# Patient Record
Sex: MALE | Race: WHITE | NOT HISPANIC OR LATINO | URBAN - METROPOLITAN AREA
[De-identification: names, ages, dates, MRNs, and addresses within clinical notes are randomized per-mention and may not be internally consistent; named-entity substitution may affect disease eponyms.]

---

## 2024-01-31 ENCOUNTER — OFFICE VISIT (OUTPATIENT)
Dept: URBAN - METROPOLITAN AREA CLINIC 63 | Facility: CLINIC | Age: 29
End: 2024-01-31
Payer: COMMERCIAL

## 2024-01-31 VITALS
BODY MASS INDEX: 20.99 KG/M2 | HEIGHT: 72 IN | HEART RATE: 66 BPM | WEIGHT: 155 LBS | OXYGEN SATURATION: 98 % | DIASTOLIC BLOOD PRESSURE: 62 MMHG | TEMPERATURE: 98.6 F | SYSTOLIC BLOOD PRESSURE: 112 MMHG

## 2024-01-31 DIAGNOSIS — R17 TOTAL BILIRUBIN, ELEVATED: ICD-10-CM

## 2024-01-31 DIAGNOSIS — Z90.49 HISTORY OF CHOLECYSTECTOMY: ICD-10-CM

## 2024-01-31 DIAGNOSIS — R10.13 EPIGASTRIC PAIN: ICD-10-CM

## 2024-01-31 PROBLEM — 428882003: Status: ACTIVE | Noted: 2024-01-31

## 2024-01-31 PROBLEM — 66187002: Status: ACTIVE | Noted: 2024-01-31

## 2024-01-31 PROCEDURE — 99204 OFFICE O/P NEW MOD 45 MIN: CPT

## 2024-01-31 RX ORDER — IBUPROFEN 800 MG
AS DIRECTED TABLET ORAL
Status: ACTIVE | COMMUNITY

## 2024-01-31 RX ORDER — SUCRALFATE 1 G/1
1 TABLET ON AN EMPTY STOMACH TABLET ORAL TWICE A DAY
Status: ACTIVE | COMMUNITY

## 2024-01-31 RX ORDER — FAMOTIDINE 20 MG/1
1 TABLET AT BEDTIME AS NEEDED TABLET, FILM COATED ORAL ONCE A DAY
Status: ACTIVE | COMMUNITY

## 2024-01-31 RX ORDER — PANTOPRAZOLE SODIUM 40 MG/1
1 TABLET TABLET, DELAYED RELEASE ORAL ONCE A DAY
Status: ACTIVE | COMMUNITY

## 2024-01-31 RX ORDER — LOPERAMIDE HYDROCHLORIDE 2 MG/1
1 CAPSULE AS NEEDED CAPSULE ORAL
Status: ACTIVE | COMMUNITY

## 2024-01-31 NOTE — HPI-PREVIOUS LABS
7/16/2023 CBC significant for hemoglobin 8.1, MPV 8.2 but otherwise within normal limits, CMP significant for calcium 10.3, albumin 5.1, bilirubin total 1.3 but otherwise within normal limits. 1/24/2024 H. pylori stool antigen just negative 11/25/2023 CBC significant for MPV of 8.3 but otherwise within normal limits, CMP within normal limits except a bilirubin of 2.5, lipase 30 3/27/2023 TSH within normal limits, magnesium within normal limits, BMP 39.1 within normal limits

## 2024-01-31 NOTE — HPI-ZZZTODAY'S VISIT
Chris is a 28-year-old male who presents for abdominal pain.  He is new to our practice and will be establishing with Dr. Mcfadden today. Past medical history significant for acute cholecystitis. Past surgical history significant for cholecystectomy, tonsillectomy.  He denies a family history of colon polyps or GI malignancy.  Chris presents today for a chief complaint of abdominal pain.  He relates that it is epigastric sharp nonradiating burning pain that improves with the use of famotidine.  He is also using pantoprazole 40 mg 30 minutes before breakfast and sucralfate with meals.  He takes famotidine as needed.  He relates that he has had problems ever since he had his gallbladder removed a year ago.  He denies dysphagia, unintentional weight loss, change in bowel habits, melena, hematochezia.

## 2024-01-31 NOTE — PHYSICAL EXAM GASTROINTESTINAL
Abdomen , soft, mild epigastric tenderness , no guarding or rigidity , no masses palpable , normal bowel sounds , Liver and Spleen, no hepatosplenomegaly , liver nontender

## 2024-01-31 NOTE — HPI-PREVIOUS IMAGING
2/27/2023 CT abdomen pelvis with contrast consistent with no acute abnormalities. 1/27/2023 right upper quadrant ultrasound significant with gallbladder wall thickening with gravel and or small stones in the lumen.  Nuclear medicine hepatobiliary scan may be helpful for correlation.

## 2024-02-05 ENCOUNTER — EGD (OUTPATIENT)
Dept: URBAN - METROPOLITAN AREA SURGERY CENTER 4 | Facility: SURGERY CENTER | Age: 29
End: 2024-02-05
Payer: COMMERCIAL

## 2024-02-05 ENCOUNTER — LAB (OUTPATIENT)
Dept: URBAN - METROPOLITAN AREA CLINIC 4 | Facility: CLINIC | Age: 29
End: 2024-02-05
Payer: COMMERCIAL

## 2024-02-05 DIAGNOSIS — K29.70 GASTRITIS WITHOUT BLEEDING, UNSPECIFIED CHRONICITY, UNSPECIFIED GASTRITIS TYPE: ICD-10-CM

## 2024-02-05 DIAGNOSIS — K29.70 GASTRITIS, UNSPECIFIED, WITHOUT BLEEDING: ICD-10-CM

## 2024-02-05 DIAGNOSIS — K31.89 OTHER DISEASES OF STOMACH AND DUODENUM: ICD-10-CM

## 2024-02-05 DIAGNOSIS — K20.90 ESOPHAGITIS, UNSPECIFIED WITHOUT BLEEDING: ICD-10-CM

## 2024-02-05 PROCEDURE — 43239 EGD BIOPSY SINGLE/MULTIPLE: CPT | Performed by: INTERNAL MEDICINE

## 2024-02-05 PROCEDURE — 88312 SPECIAL STAINS GROUP 1: CPT | Performed by: PATHOLOGY

## 2024-02-05 PROCEDURE — 88305 TISSUE EXAM BY PATHOLOGIST: CPT | Performed by: PATHOLOGY

## 2024-02-05 RX ORDER — FAMOTIDINE 20 MG/1
1 TABLET AT BEDTIME AS NEEDED TABLET, FILM COATED ORAL ONCE A DAY
Status: ACTIVE | COMMUNITY

## 2024-02-05 RX ORDER — LOPERAMIDE HYDROCHLORIDE 2 MG/1
1 CAPSULE AS NEEDED CAPSULE ORAL
Status: ACTIVE | COMMUNITY

## 2024-02-05 RX ORDER — SUCRALFATE 1 G/1
1 TABLET ON AN EMPTY STOMACH TABLET ORAL TWICE A DAY
Status: ACTIVE | COMMUNITY

## 2024-02-05 RX ORDER — IBUPROFEN 800 MG
AS DIRECTED TABLET ORAL
Status: ACTIVE | COMMUNITY

## 2024-02-05 RX ORDER — PANTOPRAZOLE SODIUM 40 MG/1
1 TABLET TABLET, DELAYED RELEASE ORAL ONCE A DAY
Status: ACTIVE | COMMUNITY

## 2024-02-07 ENCOUNTER — LAB (OUTPATIENT)
Dept: URBAN - METROPOLITAN AREA CLINIC 63 | Facility: CLINIC | Age: 29
End: 2024-02-07

## 2024-02-29 ENCOUNTER — OV EP (OUTPATIENT)
Dept: URBAN - METROPOLITAN AREA CLINIC 63 | Facility: CLINIC | Age: 29
End: 2024-02-29

## 2024-02-29 VITALS
HEIGHT: 72 IN | TEMPERATURE: 97.8 F | DIASTOLIC BLOOD PRESSURE: 68 MMHG | OXYGEN SATURATION: 98 % | WEIGHT: 148 LBS | SYSTOLIC BLOOD PRESSURE: 114 MMHG | BODY MASS INDEX: 20.05 KG/M2 | HEART RATE: 71 BPM

## 2024-02-29 NOTE — HPI-PREVIOUS PROCEDURES
Endoscopy 2/5/2024 consistent with LA grade a reflux esophagitis with no bleeding consistent with squamocolumnar mucosa with reflux type changes with no evidence of Bennett's, EOE, infectious organisms, dysplasia, malignancy. excessive gastric fluid Acute gastritis consistent with chemical reactive gastropathy Enlarged gastric folds Normal duodenum.  Biopsies with no significant abnormality

## 2024-02-29 NOTE — HPI-ZZZTODAY'S VISIT
Chris is a 28-year-old male who presents for abdominal pain.  He is new to our practice and will be establishing with Dr. Mcfadden today. Past medical history significant for acute cholecystitis. Past surgical history significant for cholecystectomy, tonsillectomy.  He denies a family history of colon polyps or GI malignancy.  Chris presents today for a chief complaint of abdominal pain.  He relates that it is epigastric sharp nonradiating burning pain that improves with the use of famotidine.  He is also using pantoprazole 40 mg 30 minutes before breakfast and sucralfate with meals.  He takes famotidine as needed.  He relates that he has had problems ever since he had his gallbladder removed a year ago.  He denies dysphagia, unintentional weight loss, change in bowel habits, melena, hematochezia. Chris is a very pleasant 28-year-old male who presents for follow-up of abdominal pain.  He was new patient to our practice on 1/31/2024.  Past medical history significant for acute cholecystitis.  Past surgical history significant for cholecystectomy and tonsillectomy.  Last endoscopy 2/5/2024.  Denies previous colonoscopy.  Denies a family history of colon polyps or GI malignancy. At last visit in January was complaining of abdominal pain that was epigastric sharp nonradiating burning pain improving with the use of famotidine and pantoprazole.  In addition he was using sucralfate.  For his epigastric pain and EGD was indicated.  He was scheduled to have blood work with his primary care provider so blood work was not ordered.  However he did have a total elevated bilirubin fractionated bilirubin was ordered. apple cider vinegar glutamine to coat to, tumeric

## 2024-02-29 NOTE — HPI-PREVIOUS LABS
7/16/2023 CBC significant for hemoglobin 8.1, MPV 8.2 but otherwise within normal limits, CMP significant for calcium 10.3, albumin 5.1, bilirubin total 1.3 but otherwise within normal limits. 1/24/2024 H. pylori stool antigen just negative 11/25/2023 CBC significant for MPV of 8.3 but otherwise within normal limits, CMP within normal limits except a bilirubin of 2.5, lipase 30 3/27/2023 TSH within normal limits, magnesium within normal limits, BMP 39.1 within normal limits 2/22/2024 celiac panel negative, CMP significant for albumin of 5.2, globulin 1.6 albumin globulin ratio 3.3, bilirubin total 1.5, alk phos and LFTs within normal limits, CBC within normal limits, iron panel within normal limits, ferritin B12 and folate within normal limits, IgA within normal limits, testosterone within normal limits. 1/25/2024 H. pylori not detected

## 2024-05-01 ENCOUNTER — OFFICE VISIT (OUTPATIENT)
Dept: URBAN - METROPOLITAN AREA CLINIC 63 | Facility: CLINIC | Age: 29
End: 2024-05-01